# Patient Record
Sex: FEMALE | Race: WHITE | NOT HISPANIC OR LATINO
[De-identification: names, ages, dates, MRNs, and addresses within clinical notes are randomized per-mention and may not be internally consistent; named-entity substitution may affect disease eponyms.]

---

## 2017-11-30 ENCOUNTER — RESULT REVIEW (OUTPATIENT)
Age: 62
End: 2017-11-30

## 2020-06-04 ENCOUNTER — APPOINTMENT (OUTPATIENT)
Dept: ORTHOPEDIC SURGERY | Facility: CLINIC | Age: 65
End: 2020-06-04
Payer: MEDICARE

## 2020-06-04 PROCEDURE — 99203 OFFICE O/P NEW LOW 30 MIN: CPT | Mod: 95

## 2020-06-04 NOTE — PHYSICAL EXAM
[de-identified] : Virtual physical exam demonstrates the patient to be well developed, well-nourished and in no acute distress. The patient is alert and oriented x3 and seated upright. Normal mood and affect. Skin is without rashes. Breathing non-labored. Normal balance and gait. \par \par On the left, the thumb A1 pulley is tender with active triggering. No other fingers have A1 pulley tenderness or active triggering. \par \par

## 2020-06-04 NOTE — HISTORY OF PRESENT ILLNESS
[FreeTextEntry1] : The patient is a 67 year old female who presents with locking, pain, and stiffness in her left thumb. Her symptoms are localized to the palmar aspect of the MP joint. The patient states that her symptoms were of gradual onset. She describes her symptoms as frequent. There is an associated moderate pain, worse in the morning. She states her symptoms are worsened by gripping and repetitive use/activity of the hand. She has been using NSAIDs, icing and wearing a thumb spica brace with some relief. There is no history of injury or new activity. She has had no treatment to date. \par \par

## 2020-06-04 NOTE — ADDENDUM
[FreeTextEntry1] : Documented by Araceli Bell acting solely as a scribe for Dr. Susie Blank on 06/04/2020.\par \par All medical record entries made by the Scribe were at my, Dr. Susie Blank, direction and personally dictated by me on 06/04/2020. I have personally reviewed the chart and agree that the record accurately reflects my personal performance of the history, physical exam, assessment and plan.

## 2020-06-04 NOTE — REASON FOR VISIT
[Home] : at home, [unfilled] , at the time of the visit. [Medical Office: ([facility])___] : at the medical office located in  [Other:____] : [unfilled] [Verbal consent obtained from patient] : the patient, [unfilled]

## 2020-06-04 NOTE — ASSESSMENT
[FreeTextEntry1] : 65 year old female presents with a left thumb trigger. We talked about the nature of the condition and treatment options including conservative management versus surgical intervention. We discussed having the patient come into the office sometime in the next 1-2 weeks to receive a steroid injection into the left thumb flexor tendon sheath. \par \par 25 minutes spent on this encounter

## 2020-06-16 ENCOUNTER — APPOINTMENT (OUTPATIENT)
Dept: ORTHOPEDIC SURGERY | Facility: CLINIC | Age: 65
End: 2020-06-16
Payer: MEDICARE

## 2020-06-16 VITALS — TEMPERATURE: 98.7 F

## 2020-06-16 PROCEDURE — 99214 OFFICE O/P EST MOD 30 MIN: CPT | Mod: 25

## 2020-06-16 PROCEDURE — 20550 NJX 1 TENDON SHEATH/LIGAMENT: CPT | Mod: FA

## 2021-09-16 ENCOUNTER — APPOINTMENT (OUTPATIENT)
Dept: ORTHOPEDIC SURGERY | Facility: CLINIC | Age: 66
End: 2021-09-16
Payer: MEDICARE

## 2021-09-16 VITALS
DIASTOLIC BLOOD PRESSURE: 86 MMHG | SYSTOLIC BLOOD PRESSURE: 145 MMHG | HEIGHT: 65 IN | HEART RATE: 86 BPM | WEIGHT: 190 LBS | OXYGEN SATURATION: 97 % | BODY MASS INDEX: 31.65 KG/M2

## 2021-09-16 PROCEDURE — 99214 OFFICE O/P EST MOD 30 MIN: CPT | Mod: 25

## 2021-09-16 PROCEDURE — 20550 NJX 1 TENDON SHEATH/LIGAMENT: CPT | Mod: FA

## 2022-02-17 ENCOUNTER — APPOINTMENT (OUTPATIENT)
Dept: ORTHOPEDIC SURGERY | Facility: CLINIC | Age: 67
End: 2022-02-17
Payer: MEDICARE

## 2022-02-17 PROCEDURE — 26055 INCISE FINGER TENDON SHEATH: CPT | Mod: FA

## 2022-02-28 ENCOUNTER — APPOINTMENT (OUTPATIENT)
Dept: ORTHOPEDIC SURGERY | Facility: CLINIC | Age: 67
End: 2022-02-28
Payer: MEDICARE

## 2022-02-28 PROCEDURE — 99024 POSTOP FOLLOW-UP VISIT: CPT

## 2022-03-08 ENCOUNTER — APPOINTMENT (OUTPATIENT)
Dept: ORTHOPEDIC SURGERY | Facility: CLINIC | Age: 67
End: 2022-03-08
Payer: MEDICARE

## 2022-03-08 DIAGNOSIS — M65.312 TRIGGER THUMB, LEFT THUMB: ICD-10-CM

## 2022-03-08 PROCEDURE — 99024 POSTOP FOLLOW-UP VISIT: CPT

## 2022-04-04 ENCOUNTER — APPOINTMENT (OUTPATIENT)
Dept: ORTHOPEDIC SURGERY | Facility: CLINIC | Age: 67
End: 2022-04-04
Payer: MEDICARE

## 2022-04-04 VITALS
WEIGHT: 190 LBS | DIASTOLIC BLOOD PRESSURE: 90 MMHG | HEART RATE: 90 BPM | SYSTOLIC BLOOD PRESSURE: 151 MMHG | BODY MASS INDEX: 31.65 KG/M2 | HEIGHT: 65 IN

## 2022-04-04 DIAGNOSIS — M25.561 PAIN IN RIGHT KNEE: ICD-10-CM

## 2022-04-04 DIAGNOSIS — M17.12 UNILATERAL PRIMARY OSTEOARTHRITIS, LEFT KNEE: ICD-10-CM

## 2022-04-04 DIAGNOSIS — M23.204 DERANGEMENT OF UNSPECIFIED MEDIAL MENISCUS DUE TO OLD TEAR OR INJURY, LEFT KNEE: ICD-10-CM

## 2022-04-04 PROCEDURE — 99213 OFFICE O/P EST LOW 20 MIN: CPT

## 2022-04-04 RX ORDER — CLOBETASOL PROPIONATE 0.5 MG/G
0.05 OINTMENT TOPICAL
Qty: 30 | Refills: 0 | Status: ACTIVE | COMMUNITY
Start: 2021-11-18

## 2022-04-04 RX ORDER — GABAPENTIN 300 MG/1
300 CAPSULE ORAL
Qty: 90 | Refills: 0 | Status: ACTIVE | COMMUNITY
Start: 2021-12-14

## 2022-04-04 RX ORDER — MUPIROCIN 20 MG/G
2 OINTMENT TOPICAL
Qty: 22 | Refills: 0 | Status: ACTIVE | COMMUNITY
Start: 2021-11-22

## 2022-04-04 RX ORDER — GABAPENTIN 100 MG/1
100 CAPSULE ORAL
Qty: 60 | Refills: 0 | Status: ACTIVE | COMMUNITY
Start: 2021-09-14

## 2022-04-04 RX ORDER — KETOCONAZOLE 20 MG/G
2 CREAM TOPICAL
Qty: 60 | Refills: 0 | Status: ACTIVE | COMMUNITY
Start: 2022-03-02

## 2022-04-04 RX ORDER — EZETIMIBE 10 MG/1
10 TABLET ORAL
Qty: 90 | Refills: 0 | Status: ACTIVE | COMMUNITY
Start: 2021-11-17

## 2022-04-04 RX ORDER — COLESEVELAM HYDROCHLORIDE 625 MG/1
625 TABLET, COATED ORAL
Qty: 360 | Refills: 0 | Status: ACTIVE | COMMUNITY
Start: 2021-12-07

## 2022-04-18 ENCOUNTER — NON-APPOINTMENT (OUTPATIENT)
Age: 67
End: 2022-04-18

## 2022-05-02 ENCOUNTER — RX RENEWAL (OUTPATIENT)
Age: 67
End: 2022-05-02

## 2022-05-02 RX ORDER — MELOXICAM 15 MG/1
15 TABLET ORAL
Qty: 30 | Refills: 0 | Status: ACTIVE | COMMUNITY
Start: 2022-04-04 | End: 1900-01-01

## 2023-05-31 ENCOUNTER — APPOINTMENT (OUTPATIENT)
Dept: ORTHOPEDIC SURGERY | Facility: CLINIC | Age: 68
End: 2023-05-31
Payer: MEDICARE

## 2023-05-31 ENCOUNTER — RESULT CHARGE (OUTPATIENT)
Age: 68
End: 2023-05-31

## 2023-05-31 VITALS — BODY MASS INDEX: 31.65 KG/M2 | HEIGHT: 65 IN | WEIGHT: 190 LBS

## 2023-05-31 PROCEDURE — 73562 X-RAY EXAM OF KNEE 3: CPT | Mod: RT

## 2023-05-31 PROCEDURE — 99203 OFFICE O/P NEW LOW 30 MIN: CPT

## 2023-05-31 PROCEDURE — L1833: CPT | Mod: KV,KX,RT

## 2023-05-31 NOTE — IMAGING
[AP] : anteroposterior [Right] : right knee [[place]] : skyline [Lateral] : lateral [FreeTextEntry9] : minimally dispolaced transverse patella fracture [de-identified] : R knee\par swelling to anterior knee.\par ttp distal pole of patealla\par able to SLR\par quad strength 4/5

## 2023-05-31 NOTE — HISTORY OF PRESENT ILLNESS
[9] : 9 [4] : 4 [de-identified] : 05/31/23: pt tripped over a curb yesterday 05/30/23 and landed on both of knees, pt c.o pain in rt knee  [FreeTextEntry1] : rt knee [FreeTextEntry5] : pt tripped on a curb 05/30/23

## 2023-06-08 ENCOUNTER — APPOINTMENT (OUTPATIENT)
Dept: ORTHOPEDIC SURGERY | Facility: CLINIC | Age: 68
End: 2023-06-08

## 2023-06-08 ENCOUNTER — APPOINTMENT (OUTPATIENT)
Dept: ORTHOPEDIC SURGERY | Facility: CLINIC | Age: 68
End: 2023-06-08
Payer: MEDICARE

## 2023-06-08 VITALS — HEIGHT: 65 IN | BODY MASS INDEX: 31.65 KG/M2 | WEIGHT: 190 LBS

## 2023-06-08 PROCEDURE — 27520 TREAT KNEECAP FRACTURE: CPT | Mod: RT

## 2023-06-08 PROCEDURE — 99214 OFFICE O/P EST MOD 30 MIN: CPT | Mod: 25

## 2023-06-08 NOTE — HISTORY OF PRESENT ILLNESS
[2] : 2 [0] : 0 [Leisure] : leisure [Meds] : meds [Standing] : standing [Walking] : walking [Exercising] : exercising [de-identified] : 06/08/2023 Ms. TORI MORALES, a 68 year old female, presents today for right knee. s/p fall on 05.30.21, was seen in [facility] on 05.31.23 and was dx with patellar fracture. Presents today in a gerard brace locked in extension and walking with a walker for assistance, has been minimally WB on the right leg.  [] : no [FreeTextEntry1] : Rt Knee [FreeTextEntry3] : 05/31/23 [FreeTextEntry5] : Pt tripped over a curb and landed on B/L Knees. Pt complaining of slight pain and numbness in the Rt Knee. Pt not able to bare weight in the Rt Knee. Pt using a walker and wearing [place] brace. Pt noticed swelling has come down significantly since initial injury.  H/O neuropathy.  [FreeTextEntry9] : Meloxicam, Tylenol [de-identified] : Sveta KELLER [de-identified] : XR

## 2023-06-08 NOTE — DISCUSSION/SUMMARY
[de-identified] : 68f with right knee transverse patellar fracture 05.30.23. Able to SLR on exam today. \par 1) c/w gerard brace locked in extension, wbat\par 2) cryotherapy, rest and activity modification\par 3) rtc 10 days, repeat x-ray \par \par \par Entered by Heidi De La O acting as scribe.\par Dr. Mehta-\par The documentation recorded by the scribe accurately reflects the service I personally performed and the decisions made by me. \par \par

## 2023-06-13 ENCOUNTER — TRANSCRIPTION ENCOUNTER (OUTPATIENT)
Age: 68
End: 2023-06-13

## 2023-06-22 ENCOUNTER — APPOINTMENT (OUTPATIENT)
Dept: ORTHOPEDIC SURGERY | Facility: CLINIC | Age: 68
End: 2023-06-22
Payer: MEDICARE

## 2023-06-22 VITALS — HEIGHT: 64 IN | WEIGHT: 195 LBS | BODY MASS INDEX: 33.29 KG/M2

## 2023-06-22 PROCEDURE — 99024 POSTOP FOLLOW-UP VISIT: CPT

## 2023-06-22 PROCEDURE — 73560 X-RAY EXAM OF KNEE 1 OR 2: CPT | Mod: RT

## 2023-06-22 NOTE — DISCUSSION/SUMMARY
[de-identified] : 68f with right knee transverse patellar fracture 05.30.23. Remains able to SLR on exam today. \par 1) c/w gerrad brace locked in extension, wbat\par 2) cryotherapy, rest and activity modification\par 3) rtc  2 weeks, repeat x-ray \par \par \par Entered by Heidi De La O acting as scribe.\par Dr. Mehta-\par The documentation recorded by the scribe accurately reflects the service I personally performed and the decisions made by me. \par \par

## 2023-06-22 NOTE — HISTORY OF PRESENT ILLNESS
[3] : 3 [0] : 0 [Dull/Aching] : dull/aching [Localized] : localized [Tightness] : tightness [Intermittent] : intermittent [Leisure] : leisure [Meds] : meds [Standing] : standing [Walking] : walking [Exercising] : exercising [de-identified] : 6/22/23: Here to f/up right knee patellar fracture. Doing well, pain has been improving. Compliant with gerard brace. \par 06/08/2023 Ms. TORI MORALES, a 68 year old female, presents today for right knee. s/p fall on 05.30.21, was seen in [facility] on 05.31.23 and was dx with patellar fracture. Presents today in a gerard brace locked in extension and walking with a walker for assistance, has been minimally WB on the right\par  leg.  [] : no [FreeTextEntry1] : Rt Knee [FreeTextEntry3] : 05/31/23 [FreeTextEntry5] : Pt tripped over a curb and landed on B/L Knees. Pt complaining of slight pain and numbness in the Rt Knee. Pt not able to bare weight in the Rt Knee. Pt using a walker and wearing Jess brace. Pt noticed swelling has come down significantly since initial injury.  H/O neuropathy. \par 6/22/23: Pt is here to follow up on Rt knee. Pt has been feeling well since last visit. States that brace has been sliding down a lot on her. Notes discomfort while walking. Pain is localized and denies n/t. Also notes Lt foot has started swelling.  [FreeTextEntry9] : Meloxicam, Tylenol [de-identified] : Sveta KELLER [de-identified] : XR

## 2023-06-22 NOTE — IMAGING
[Right] : right knee [AP] : anteroposterior [The fracture is in acceptable alignment. There is progression in healing seen] : The fracture is in acceptable alignment. There is progression in healing seen

## 2023-07-06 ENCOUNTER — APPOINTMENT (OUTPATIENT)
Dept: ORTHOPEDIC SURGERY | Facility: CLINIC | Age: 68
End: 2023-07-06
Payer: MEDICARE

## 2023-07-06 VITALS — HEIGHT: 64 IN | BODY MASS INDEX: 33.29 KG/M2 | WEIGHT: 195 LBS

## 2023-07-06 PROCEDURE — 99024 POSTOP FOLLOW-UP VISIT: CPT

## 2023-07-06 PROCEDURE — 73560 X-RAY EXAM OF KNEE 1 OR 2: CPT | Mod: RT

## 2023-07-06 NOTE — DISCUSSION/SUMMARY
[de-identified] : 68f with right knee transverse patellar fracture 05.30.23. Remains able to SLR on exam today. \par 1) c/w gerard brace 0-30, wbat\par 2) cryotherapy, rest and activity modification\par 3) rtc 10 days to advance brace\par \par \par Entered by Heidi De La O acting as scribe.\par Dr. Mehta-\par The documentation recorded by the scribe accurately reflects the service I personally performed and the decisions made by me. \par \par

## 2023-07-06 NOTE — IMAGING
[Right] : right knee [AP] : anteroposterior [Lateral] : lateral [The fracture is in acceptable alignment. There is progression in healing seen] : The fracture is in acceptable alignment. There is progression in healing seen

## 2023-07-06 NOTE — HISTORY OF PRESENT ILLNESS
[2] : 2 [de-identified] : 7/6/23: Here to f/up right knee patellar fx. Doing well. Compliant with gerard brace. Only mild remaining pain complaints. \par 6/22/23: Here to f/up right knee patellar fracture. Doing well, pain has been improving. Compliant with gerard brace. \par 06/08/2023 Ms. TORI MORALES, a 68 year old female, presents today for right knee. s/p fall on 05.30.21, was seen in [facility] on 05.31.23 and was dx with patellar fracture. Presents today in a gerard brace locked in extension and walking with a walker for assistance, has been minimally WB on the right\par  leg.  [FreeTextEntry1] : Rt Knee [FreeTextEntry5] : PO 1: Rt Knee. Pt feeling slight pain managing with Advil. Pt feels slight stiffness. Pt using brace and walker for support.  [de-identified] : Sx

## 2023-07-18 ENCOUNTER — APPOINTMENT (OUTPATIENT)
Dept: ORTHOPEDIC SURGERY | Facility: CLINIC | Age: 68
End: 2023-07-18
Payer: MEDICARE

## 2023-07-18 VITALS — WEIGHT: 195 LBS | BODY MASS INDEX: 33.29 KG/M2 | HEIGHT: 64 IN

## 2023-07-18 PROCEDURE — 99024 POSTOP FOLLOW-UP VISIT: CPT

## 2023-07-18 NOTE — HISTORY OF PRESENT ILLNESS
[1] : 2 [de-identified] : 7/18/23: Here to f/up right knee patellar fx. Doing well, is able to induce slight bend in knee whilst walking. Reports knee feeling stronger and more stbale.\par 7/6/23: Here to f/up right knee patellar fx. Doing well. Compliant with gerard brace. Only mild remaining pain complaints. \par 6/22/23: Here to f/up right knee patellar fracture. Doing well, pain has been improving. Compliant with gerard brace. \par 06/08/2023 Ms. TORI MORALES, a 68 year old female, presents today for right knee. s/p fall on 05.30.21, was seen in [facility] on 05.31.23 and was dx with patellar fracture. Presents today in a gerard brace locked in extension and walking with a walker for assistance, has been minimally WB on the right\par  leg.  [FreeTextEntry1] : Rt Knee [FreeTextEntry5] : PO 2: Rt Knee. Pt feeling slight discomfort and stiffness managing with Advil. Pt using brace and walker for support.   [de-identified] : Sx

## 2023-07-18 NOTE — DISCUSSION/SUMMARY
[de-identified] : 68f with right knee transverse patellar fracture 05.30.23. Remains able to SLR on exam today. \par 1) c/w gerard brace 0-60, wbat\par 2) cryotherapy, rest and activity modification\par 3) rtc 10 days to advance brace\par \par \par Entered by Heidi De La O acting as scribe.\par Dr. Mehta-\par The documentation recorded by the scribe accurately reflects the service I personally performed and the decisions made by me. \par \par

## 2023-07-28 ENCOUNTER — APPOINTMENT (OUTPATIENT)
Dept: ORTHOPEDIC SURGERY | Facility: CLINIC | Age: 68
End: 2023-07-28
Payer: MEDICARE

## 2023-07-28 VITALS — BODY MASS INDEX: 33.29 KG/M2 | HEIGHT: 64 IN | WEIGHT: 195 LBS

## 2023-07-28 PROCEDURE — 73564 X-RAY EXAM KNEE 4 OR MORE: CPT | Mod: RT

## 2023-07-28 PROCEDURE — 99024 POSTOP FOLLOW-UP VISIT: CPT

## 2023-07-28 NOTE — PHYSICAL EXAM
[Right] : right knee [5___] : quadriceps 5[unfilled]/5 [NL (0)] : extension 0 degrees [] : no extensor lag [TWNoteComboBox7] : flexion 60 degrees

## 2023-07-28 NOTE — IMAGING
[Right] : right knee [The fracture is in acceptable alignment. There is progression in healing seen] : The fracture is in acceptable alignment. There is progression in healing seen

## 2023-07-28 NOTE — DISCUSSION/SUMMARY
[de-identified] : 68f with right knee transverse patellar fracture 05.30.23. Remains able to SLR on exam today. \par 1) c/w gerard brace 0-90, wbat\par 2) cryotherapy, rest and activity modification\par 3) She can d/c brace in 5 days.\par 4) Start PT for AROM, PROM, strengthening.\par 5) rtc 3 weeks\par \par \par Entered by Heidi De La O acting as scribe.\par Dr. Mehta-\par The documentation recorded by the scribe accurately reflects the service I personally performed and the decisions made by me. \par \par

## 2023-07-28 NOTE — HISTORY OF PRESENT ILLNESS
[1] : 2 [de-identified] : 7/28/23:\par 7/18/23: Here to f/up right knee patellar fx. Doing well, is able to induce slight bend in knee whilst walking. Reports knee feeling stronger and more stbale.\par 7/6/23: Here to f/up right knee patellar fx. Doing well. Compliant with gerard brace. Only mild remaining pain complaints. \par 6/22/23: Here to f/up right knee patellar fracture. Doing well, pain has been improving. Compliant with gerard brace. \par 06/08/2023 Ms. TORI MORALES, a 68 year old female, presents today for right knee. s/p fall on 05.30.21, was seen in [facility] on 05.31.23 and was dx with patellar fracture. Presents today in a gerard brace locked in extension and walking with a walker for assistance, has been minimally WB on the right\par  leg.  [FreeTextEntry1] : Rt Knee [FreeTextEntry5] : PO 2: Rt Knee. Pt feeling slight discomfort and stiffness managing with Advil. Pt using brace and walker for support.   [de-identified] : Sx

## 2023-08-17 ENCOUNTER — APPOINTMENT (OUTPATIENT)
Dept: ORTHOPEDIC SURGERY | Facility: CLINIC | Age: 68
End: 2023-08-17
Payer: MEDICARE

## 2023-08-17 VITALS — HEIGHT: 64 IN | WEIGHT: 195 LBS | BODY MASS INDEX: 33.29 KG/M2

## 2023-08-17 PROCEDURE — 99024 POSTOP FOLLOW-UP VISIT: CPT

## 2023-08-17 NOTE — DISCUSSION/SUMMARY
[de-identified] : 68f with right knee transverse patellar fracture 05.30.23. Remains able to SLR on exam today.  1) c/w PT. rx renewed today.  2) cryotherapy, rest and activity modification 3) rtc 6 weeks

## 2023-08-17 NOTE — PHYSICAL EXAM
[Right] : right knee [NL (0)] : extension 0 degrees [5___] : quadriceps 5[unfilled]/5 [] : no extensor lag [TWNoteComboBox7] : flexion 130 degrees

## 2023-08-17 NOTE — HISTORY OF PRESENT ILLNESS
[0] : 0 [] : yes [de-identified] : 8/17/23: Here to f/up right knee patellar fx. Patient has d/c brace and attending PT 2x/wk with improvement in pain.  7/18/23: Here to f/up right knee patellar fx. Doing well, is able to induce slight bend in knee whilst walking. Reports knee feeling stronger and more stbale. 7/6/23: Here to f/up right knee patellar fx. Doing well. Compliant with gerard brace. Only mild remaining pain complaints.  6/22/23: Here to f/up right knee patellar fracture. Doing well, pain has been improving. Compliant with gerard brace.  06/08/2023 Ms. TORI MORALES, a 68 year old female, presents today for right knee. s/p fall on 05.30.21, was seen in [facility] on 05.31.23 and was dx with patellar fracture. Presents today in a gerard brace locked in extension and walking with a walker for assistance, has been minimally WB on the right  leg.  [FreeTextEntry1] : Rt Knee [FreeTextEntry5] : Patient is here for FU FX on the RT knee. Goes to PT 2x a week. STates its helping. Occasionally feels stiffness in the knee. No longer wearing the brace.  [de-identified] : Physical therapy

## 2023-09-28 ENCOUNTER — APPOINTMENT (OUTPATIENT)
Dept: ORTHOPEDIC SURGERY | Facility: CLINIC | Age: 68
End: 2023-09-28
Payer: MEDICARE

## 2023-09-28 VITALS — HEIGHT: 64 IN | WEIGHT: 195 LBS | BODY MASS INDEX: 33.29 KG/M2

## 2023-09-28 PROCEDURE — 99213 OFFICE O/P EST LOW 20 MIN: CPT

## 2023-12-07 ENCOUNTER — APPOINTMENT (OUTPATIENT)
Dept: ORTHOPEDIC SURGERY | Facility: CLINIC | Age: 68
End: 2023-12-07
Payer: MEDICARE

## 2023-12-07 VITALS — BODY MASS INDEX: 33.29 KG/M2 | WEIGHT: 195 LBS | HEIGHT: 64 IN

## 2023-12-07 DIAGNOSIS — S82.031A DISPLACED TRANSVERSE FRACTURE OF RIGHT PATELLA, INITIAL ENCOUNTER FOR CLOSED FRACTURE: ICD-10-CM

## 2023-12-07 DIAGNOSIS — Z78.9 OTHER SPECIFIED HEALTH STATUS: ICD-10-CM

## 2023-12-07 DIAGNOSIS — M17.11 UNILATERAL PRIMARY OSTEOARTHRITIS, RIGHT KNEE: ICD-10-CM

## 2023-12-07 PROCEDURE — 73564 X-RAY EXAM KNEE 4 OR MORE: CPT | Mod: RT

## 2023-12-07 PROCEDURE — 99213 OFFICE O/P EST LOW 20 MIN: CPT

## 2024-11-19 ENCOUNTER — APPOINTMENT (OUTPATIENT)
Dept: ORTHOPEDIC SURGERY | Facility: CLINIC | Age: 69
End: 2024-11-19

## 2024-11-26 ENCOUNTER — APPOINTMENT (OUTPATIENT)
Dept: ORTHOPEDIC SURGERY | Facility: CLINIC | Age: 69
End: 2024-11-26
Payer: MEDICARE

## 2024-11-26 VITALS — WEIGHT: 200 LBS | BODY MASS INDEX: 34.15 KG/M2 | HEIGHT: 64 IN

## 2024-11-26 DIAGNOSIS — M17.11 UNILATERAL PRIMARY OSTEOARTHRITIS, RIGHT KNEE: ICD-10-CM

## 2024-11-26 DIAGNOSIS — M54.31 SCIATICA, RIGHT SIDE: ICD-10-CM

## 2024-11-26 PROCEDURE — 73564 X-RAY EXAM KNEE 4 OR MORE: CPT | Mod: RT

## 2024-11-26 PROCEDURE — 99214 OFFICE O/P EST MOD 30 MIN: CPT

## 2024-11-26 PROCEDURE — L1833: CPT | Mod: RT,KX

## 2024-12-04 ENCOUNTER — APPOINTMENT (OUTPATIENT)
Dept: MRI IMAGING | Facility: CLINIC | Age: 69
End: 2024-12-04
Payer: MEDICARE

## 2024-12-04 PROCEDURE — 73721 MRI JNT OF LWR EXTRE W/O DYE: CPT | Mod: RT,MH

## 2024-12-10 ENCOUNTER — APPOINTMENT (OUTPATIENT)
Dept: ORTHOPEDIC SURGERY | Facility: CLINIC | Age: 69
End: 2024-12-10
Payer: MEDICARE

## 2024-12-10 DIAGNOSIS — M17.11 UNILATERAL PRIMARY OSTEOARTHRITIS, RIGHT KNEE: ICD-10-CM

## 2024-12-10 DIAGNOSIS — S83.231A COMPLEX TEAR OF MEDIAL MENISCUS, CURRENT INJURY, RIGHT KNEE, INITIAL ENCOUNTER: ICD-10-CM

## 2024-12-10 PROCEDURE — 99214 OFFICE O/P EST MOD 30 MIN: CPT

## 2025-02-11 ENCOUNTER — APPOINTMENT (OUTPATIENT)
Dept: ORTHOPEDIC SURGERY | Facility: CLINIC | Age: 70
End: 2025-02-11
Payer: MEDICARE

## 2025-02-11 VITALS — WEIGHT: 200 LBS | HEIGHT: 64 IN | BODY MASS INDEX: 34.15 KG/M2

## 2025-02-11 DIAGNOSIS — S83.231A COMPLEX TEAR OF MEDIAL MENISCUS, CURRENT INJURY, RIGHT KNEE, INITIAL ENCOUNTER: ICD-10-CM

## 2025-02-11 DIAGNOSIS — M17.11 UNILATERAL PRIMARY OSTEOARTHRITIS, RIGHT KNEE: ICD-10-CM

## 2025-02-11 PROCEDURE — 99213 OFFICE O/P EST LOW 20 MIN: CPT
